# Patient Record
Sex: FEMALE | Race: WHITE | NOT HISPANIC OR LATINO
[De-identification: names, ages, dates, MRNs, and addresses within clinical notes are randomized per-mention and may not be internally consistent; named-entity substitution may affect disease eponyms.]

---

## 2019-05-13 DIAGNOSIS — Z00.00 ENCOUNTER FOR GENERAL ADULT MEDICAL EXAMINATION W/OUT ABNORMAL FINDINGS: ICD-10-CM

## 2019-05-20 ENCOUNTER — APPOINTMENT (OUTPATIENT)
Dept: INTERNAL MEDICINE | Facility: CLINIC | Age: 44
End: 2019-05-20

## 2019-05-23 ENCOUNTER — APPOINTMENT (OUTPATIENT)
Dept: INTERNAL MEDICINE | Facility: CLINIC | Age: 44
End: 2019-05-23

## 2020-06-13 ENCOUNTER — EMERGENCY (EMERGENCY)
Facility: HOSPITAL | Age: 45
LOS: 1 days | Discharge: HOME | End: 2020-06-13
Attending: EMERGENCY MEDICINE
Payer: COMMERCIAL

## 2020-06-13 VITALS
RESPIRATION RATE: 18 BRPM | HEART RATE: 93 BPM | DIASTOLIC BLOOD PRESSURE: 84 MMHG | SYSTOLIC BLOOD PRESSURE: 140 MMHG | TEMPERATURE: 98 F | OXYGEN SATURATION: 99 %

## 2020-06-13 VITALS
RESPIRATION RATE: 20 BRPM | OXYGEN SATURATION: 100 % | DIASTOLIC BLOOD PRESSURE: 96 MMHG | HEART RATE: 96 BPM | SYSTOLIC BLOOD PRESSURE: 138 MMHG | HEIGHT: 62 IN | TEMPERATURE: 98 F | WEIGHT: 179.9 LBS

## 2020-06-13 LAB
ALBUMIN SERPL ELPH-MCNC: 4.6 G/DL — SIGNIFICANT CHANGE UP (ref 3.3–5)
ALP SERPL-CCNC: 141 U/L — HIGH (ref 40–120)
ALT FLD-CCNC: 17 U/L — SIGNIFICANT CHANGE UP (ref 10–45)
ANION GAP SERPL CALC-SCNC: 15 MMOL/L — SIGNIFICANT CHANGE UP (ref 5–17)
APPEARANCE UR: ABNORMAL
AST SERPL-CCNC: 15 U/L — SIGNIFICANT CHANGE UP (ref 10–40)
BACTERIA # UR AUTO: ABNORMAL
BASOPHILS # BLD AUTO: 0.03 K/UL — SIGNIFICANT CHANGE UP (ref 0–0.2)
BASOPHILS NFR BLD AUTO: 0.2 % — SIGNIFICANT CHANGE UP (ref 0–2)
BILIRUB SERPL-MCNC: 0.6 MG/DL — SIGNIFICANT CHANGE UP (ref 0.2–1.2)
BILIRUB UR-MCNC: NEGATIVE — SIGNIFICANT CHANGE UP
BUN SERPL-MCNC: 12 MG/DL — SIGNIFICANT CHANGE UP (ref 7–23)
CALCIUM SERPL-MCNC: 9.6 MG/DL — SIGNIFICANT CHANGE UP (ref 8.4–10.5)
CHLORIDE SERPL-SCNC: 100 MMOL/L — SIGNIFICANT CHANGE UP (ref 96–108)
CO2 SERPL-SCNC: 22 MMOL/L — SIGNIFICANT CHANGE UP (ref 22–31)
COLOR SPEC: YELLOW — SIGNIFICANT CHANGE UP
CREAT SERPL-MCNC: 0.64 MG/DL — SIGNIFICANT CHANGE UP (ref 0.5–1.3)
DIFF PNL FLD: NEGATIVE — SIGNIFICANT CHANGE UP
EOSINOPHIL # BLD AUTO: 0.05 K/UL — SIGNIFICANT CHANGE UP (ref 0–0.5)
EOSINOPHIL NFR BLD AUTO: 0.4 % — SIGNIFICANT CHANGE UP (ref 0–6)
EPI CELLS # UR: 16 /HPF — HIGH
GAS PNL BLDV: SIGNIFICANT CHANGE UP
GLUCOSE SERPL-MCNC: 119 MG/DL — HIGH (ref 70–99)
GLUCOSE UR QL: NEGATIVE — SIGNIFICANT CHANGE UP
HCT VFR BLD CALC: 41 % — SIGNIFICANT CHANGE UP (ref 34.5–45)
HGB BLD-MCNC: 13.4 G/DL — SIGNIFICANT CHANGE UP (ref 11.5–15.5)
HYALINE CASTS # UR AUTO: 8 /LPF — HIGH (ref 0–2)
IMM GRANULOCYTES NFR BLD AUTO: 0.4 % — SIGNIFICANT CHANGE UP (ref 0–1.5)
KETONES UR-MCNC: ABNORMAL
LEUKOCYTE ESTERASE UR-ACNC: ABNORMAL
LIDOCAIN IGE QN: 26 U/L — SIGNIFICANT CHANGE UP (ref 7–60)
LYMPHOCYTES # BLD AUTO: 1.79 K/UL — SIGNIFICANT CHANGE UP (ref 1–3.3)
LYMPHOCYTES # BLD AUTO: 14.8 % — SIGNIFICANT CHANGE UP (ref 13–44)
MCHC RBC-ENTMCNC: 27 PG — SIGNIFICANT CHANGE UP (ref 27–34)
MCHC RBC-ENTMCNC: 32.7 GM/DL — SIGNIFICANT CHANGE UP (ref 32–36)
MCV RBC AUTO: 82.5 FL — SIGNIFICANT CHANGE UP (ref 80–100)
MONOCYTES # BLD AUTO: 0.43 K/UL — SIGNIFICANT CHANGE UP (ref 0–0.9)
MONOCYTES NFR BLD AUTO: 3.6 % — SIGNIFICANT CHANGE UP (ref 2–14)
NEUTROPHILS # BLD AUTO: 9.72 K/UL — HIGH (ref 1.8–7.4)
NEUTROPHILS NFR BLD AUTO: 80.6 % — HIGH (ref 43–77)
NITRITE UR-MCNC: NEGATIVE — SIGNIFICANT CHANGE UP
NRBC # BLD: 0 /100 WBCS — SIGNIFICANT CHANGE UP (ref 0–0)
PH UR: 8.5 — HIGH (ref 5–8)
PLATELET # BLD AUTO: 472 K/UL — HIGH (ref 150–400)
POTASSIUM SERPL-MCNC: 3.7 MMOL/L — SIGNIFICANT CHANGE UP (ref 3.5–5.3)
POTASSIUM SERPL-SCNC: 3.7 MMOL/L — SIGNIFICANT CHANGE UP (ref 3.5–5.3)
PROT SERPL-MCNC: 8.4 G/DL — HIGH (ref 6–8.3)
PROT UR-MCNC: ABNORMAL
RBC # BLD: 4.97 M/UL — SIGNIFICANT CHANGE UP (ref 3.8–5.2)
RBC # FLD: 14 % — SIGNIFICANT CHANGE UP (ref 10.3–14.5)
RBC CASTS # UR COMP ASSIST: 4 /HPF — SIGNIFICANT CHANGE UP (ref 0–4)
SODIUM SERPL-SCNC: 137 MMOL/L — SIGNIFICANT CHANGE UP (ref 135–145)
SP GR SPEC: 1.02 — SIGNIFICANT CHANGE UP (ref 1.01–1.02)
UROBILINOGEN FLD QL: NEGATIVE — SIGNIFICANT CHANGE UP
WBC # BLD: 12.07 K/UL — HIGH (ref 3.8–10.5)
WBC # FLD AUTO: 12.07 K/UL — HIGH (ref 3.8–10.5)
WBC UR QL: 41 /HPF — HIGH (ref 0–5)

## 2020-06-13 PROCEDURE — 82947 ASSAY GLUCOSE BLOOD QUANT: CPT

## 2020-06-13 PROCEDURE — 83605 ASSAY OF LACTIC ACID: CPT

## 2020-06-13 PROCEDURE — 99284 EMERGENCY DEPT VISIT MOD MDM: CPT | Mod: 25

## 2020-06-13 PROCEDURE — 83690 ASSAY OF LIPASE: CPT

## 2020-06-13 PROCEDURE — 84132 ASSAY OF SERUM POTASSIUM: CPT

## 2020-06-13 PROCEDURE — 93010 ELECTROCARDIOGRAM REPORT: CPT

## 2020-06-13 PROCEDURE — 81001 URINALYSIS AUTO W/SCOPE: CPT

## 2020-06-13 PROCEDURE — 84295 ASSAY OF SERUM SODIUM: CPT

## 2020-06-13 PROCEDURE — 82962 GLUCOSE BLOOD TEST: CPT

## 2020-06-13 PROCEDURE — 80053 COMPREHEN METABOLIC PANEL: CPT

## 2020-06-13 PROCEDURE — 85027 COMPLETE CBC AUTOMATED: CPT

## 2020-06-13 PROCEDURE — 99285 EMERGENCY DEPT VISIT HI MDM: CPT

## 2020-06-13 PROCEDURE — 82803 BLOOD GASES ANY COMBINATION: CPT

## 2020-06-13 PROCEDURE — 82330 ASSAY OF CALCIUM: CPT

## 2020-06-13 PROCEDURE — 82435 ASSAY OF BLOOD CHLORIDE: CPT

## 2020-06-13 PROCEDURE — 84702 CHORIONIC GONADOTROPIN TEST: CPT

## 2020-06-13 PROCEDURE — 85014 HEMATOCRIT: CPT

## 2020-06-13 PROCEDURE — 87086 URINE CULTURE/COLONY COUNT: CPT

## 2020-06-13 PROCEDURE — 96374 THER/PROPH/DIAG INJ IV PUSH: CPT

## 2020-06-13 PROCEDURE — 93005 ELECTROCARDIOGRAM TRACING: CPT

## 2020-06-13 RX ORDER — SODIUM CHLORIDE 9 MG/ML
1000 INJECTION INTRAMUSCULAR; INTRAVENOUS; SUBCUTANEOUS ONCE
Refills: 0 | Status: COMPLETED | OUTPATIENT
Start: 2020-06-13 | End: 2020-06-13

## 2020-06-13 RX ORDER — METOCLOPRAMIDE HCL 10 MG
10 TABLET ORAL ONCE
Refills: 0 | Status: COMPLETED | OUTPATIENT
Start: 2020-06-13 | End: 2020-06-13

## 2020-06-13 RX ADMIN — Medication 10 MILLIGRAM(S): at 19:37

## 2020-06-13 RX ADMIN — SODIUM CHLORIDE 1000 MILLILITER(S): 9 INJECTION INTRAMUSCULAR; INTRAVENOUS; SUBCUTANEOUS at 19:01

## 2020-06-13 NOTE — ED ADULT NURSE REASSESSMENT NOTE - NS ED NURSE REASSESS COMMENT FT1
Report received from Tawnya Lion. AOx3, speaking in complete sentences. Unlabored, spontaneous respirations, NAD. CM in place NSR HR 90s. Pt reports nausea, MD De La Rosa aware. Awaiting blood results and urine sample at this time.

## 2020-06-13 NOTE — ED PROVIDER NOTE - NSFOLLOWUPINSTRUCTIONS_ED_ALL_ED_FT
1) Please follow-up with your primary care doctor about your diabetes medication regimen. We recommend that for the time being you discontinue use of the Ozempic as its use appears to correlate very closely with your symptoms.  If you cannot follow-up with your doctor(s), please return to the ED for any urgent issues.  2) If you have any worsening of symptoms or any other concerns please return to the ED immediately.  3) Please continue taking your home medications as directed.  4) You may have been given a copy of your labs and/or imaging.  Please go over these with your primary care doctor.    Nausea / Vomiting    Nausea is the feeling that you have to vomit. As nausea gets worse, it can lead to vomiting. Vomiting puts you at an increased risk for dehydration. Older adults and people with other diseases or a weak immune system are at higher risk for dehydration. Drink clear fluids in small but frequent amounts as tolerated. Eat bland, easy-to-digest foods in small amounts as tolerated.    SEEK IMMEDIATE MEDICAL CARE IF YOU HAVE ANY OF THE FOLLOWING SYMPTOMS: fever, inability to keep sufficient fluids down, black or bloody vomitus, black or bloody stools, lightheadedness/dizziness, chest pain, severe headache, rash, shortness of breath, cold or clammy skin, confusion, pain with urination, or any signs of dehydration.

## 2020-06-13 NOTE — ED PROVIDER NOTE - ATTENDING CONTRIBUTION TO CARE
I saw and evaluated patient with resident. I discussed H+P and MDM with resident. I agree with the statements made by the resident unless otherwise noted.    The care of this patient was in support of the [facility] countermeasures to Covid-19.

## 2020-06-13 NOTE — ED PROVIDER NOTE - PATIENT PORTAL LINK FT
You can access the FollowMyHealth Patient Portal offered by [facility] by registering at the following website: [email or web address]. By joining [employer]’s FollowMyHealth portal, you will also be able to view your health information using other applications (apps) compatible with our system.

## 2020-06-13 NOTE — ED PROVIDER NOTE - CLINICAL SUMMARY MEDICAL DECISION MAKING FREE TEXT BOX
44F p/w vomiting in setting of recent dm medication switch. Mildly dehydrated appearing, abdomen soft. Will rehydrate, check electrolyes, ekg, r/o DKA. 44F p/w vomiting in setting of recent dm medication switch. Mildly dehydrated appearing, abdomen soft. Will rehydrate, check electrolyes, ekg, r/o DKA.    MUSA De La Rosa MD: Pt is a 45 y/o female with PMH dm, hld, who presents from urgent care for nausea and vomiting. States that she recently started a new DM medication (once a week injection of semaglutide). Pt took 2nd dose today which caused onset of n/v. States she had similar rxn 1 week ago in PCPs office when she self-administered the medication at that time.  Has not been able to tolerate PO today. Had mild AP, now resolved. Rec'd 8mg zofran PTA. Pt denies: chest pain, SOB, cough, fevers, chills, pleuritic chest pain, diarrhea, back pain, neck pain, HA, neck stiffness, focal numbness or weakness, visual changes, dizziness, lightheadedness, leg pain/swelling, recent travel, recent trauma, recent immobilization, dysuria, hematuria, rash. Ddx includes, however, is not limited to: adverse medication rxn, pancreatitis, DKA, dehydration, electrolyte abnormality, other. Plan: basic labs, IVF, antiemetics, reassess

## 2020-06-13 NOTE — ED ADULT NURSE NOTE - OBJECTIVE STATEMENT
44 year old female presents to the ED with nausea and vomiting since yesterday.  She said nausea has been constant and has had decreased po intake over the past day as well.  She has been constipated for one week.  She is on Ozempic for about one week and said she yesterday she took 2mg yesterday.  She went to urgent care today and was given 8mg of Zofran with some relief of nausea.  She denies: chest pain, shortness of breath, fevers, sick contacts, diarrhea, urinary frequency or burning.

## 2020-06-13 NOTE — ED PROVIDER NOTE - OBJECTIVE STATEMENT
44F hx dm, hld, p/f nausea and vomiting in setting of recently starting a new DM medication (once a week injection of semaglutide). Pt says her primary (Collette) recently switched her from metformin for reasons pt is unsure of. Pt said she has vomited more this morning than she ever has. Says the vomit was nonbloody. Says vomiting has been assoc w/ mild epigastric abdominal pain. Denies diarrhea, surgical hx, sob/cp, visual changes.

## 2020-06-14 LAB
CULTURE RESULTS: SIGNIFICANT CHANGE UP
SPECIMEN SOURCE: SIGNIFICANT CHANGE UP

## 2020-06-15 NOTE — ED POST DISCHARGE NOTE - DETAILS
6/15/2020: left VM for patient to call back. -Apurva Clemons PA-C spoke with patient and she states that she is not having any urinary symptoms at all and was not diagnosed with a UTI. advised her that she does not need antibiotics at this time, but to have urine culture repeated if she develops symptoms. patient verbalized understanding and agreement with plan. -Apurva Clemons PA-C

## 2020-06-15 NOTE — ED POST DISCHARGE NOTE - RESULT SUMMARY
urine culture contaminated though urinalysis +. patient needs repeat culture if still symptomatic, no antibiotics sent.

## 2020-07-01 PROBLEM — G47.00 INSOMNIA, UNSPECIFIED: Chronic | Status: ACTIVE | Noted: 2020-06-13

## 2020-07-01 PROBLEM — E78.5 HYPERLIPIDEMIA, UNSPECIFIED: Chronic | Status: ACTIVE | Noted: 2020-06-13

## 2020-07-01 PROBLEM — F32.9 MAJOR DEPRESSIVE DISORDER, SINGLE EPISODE, UNSPECIFIED: Chronic | Status: ACTIVE | Noted: 2020-06-13

## 2020-07-01 PROBLEM — E11.9 TYPE 2 DIABETES MELLITUS WITHOUT COMPLICATIONS: Chronic | Status: ACTIVE | Noted: 2020-06-13

## 2020-07-02 ENCOUNTER — APPOINTMENT (OUTPATIENT)
Dept: GASTROENTEROLOGY | Facility: CLINIC | Age: 45
End: 2020-07-02
Payer: COMMERCIAL

## 2020-07-02 VITALS
HEIGHT: 62 IN | OXYGEN SATURATION: 98 % | SYSTOLIC BLOOD PRESSURE: 130 MMHG | TEMPERATURE: 98.7 F | BODY MASS INDEX: 32.02 KG/M2 | DIASTOLIC BLOOD PRESSURE: 80 MMHG | WEIGHT: 174 LBS | HEART RATE: 99 BPM

## 2020-07-02 DIAGNOSIS — K52.9 NONINFECTIVE GASTROENTERITIS AND COLITIS, UNSPECIFIED: ICD-10-CM

## 2020-07-02 DIAGNOSIS — Z83.79 FAMILY HISTORY OF OTHER DISEASES OF THE DIGESTIVE SYSTEM: ICD-10-CM

## 2020-07-02 PROCEDURE — 99203 OFFICE O/P NEW LOW 30 MIN: CPT

## 2020-07-02 RX ORDER — BUPROPION HCL 150 MG
150 TABLET,SUSTAINED-RELEASE 12 HR ORAL
Refills: 0 | Status: ACTIVE | COMMUNITY

## 2020-07-02 RX ORDER — LISDEXAMFETAMINE DIMESYLATE 50 MG/1
50 CAPSULE ORAL
Refills: 0 | Status: ACTIVE | COMMUNITY

## 2020-07-02 RX ORDER — METFORMIN HYDROCHLORIDE 1000 MG/1
1000 TABLET, COATED ORAL
Refills: 0 | Status: ACTIVE | COMMUNITY

## 2020-07-02 RX ORDER — LINAGLIPTIN 5 MG/1
5 TABLET, FILM COATED ORAL
Refills: 0 | Status: ACTIVE | COMMUNITY

## 2020-07-02 NOTE — HISTORY OF PRESENT ILLNESS
[FreeTextEntry1] : \par 2 nights ago awoke at 2am; had bitter taste in mouth; no fever or chills\par then awoke vomiting at 4am - vomited for 24 hours; retched 7-8 x with each episode\par did have diarrhea 1pm yesterday afternoon\par felt fine by yesterday morning\par Had eaten same dinner as parents - they did not get sick; \par \par 3 weeks ago, in the city, had same thiing - after a new medication with a high does - Ozempic - went to ER in [facility], and stayed with parents for the week\par stopped the Ozempic\par \par Has had 4 bouts of 24 hour GI bugs\par \par Has GERD almost daily - better with omeprazole, taking daily since March; can taste it in back of throat\par \par Had last EGD 5 years ago - \par \par Recently diagnosed with DM, 3 months ago, with A1C 7.7\par had been placed on Tradjenta and metformin\par Ozempic was added in May; at second dose had vomiting reaction\par  last A1C = 7.1\par Saw enodcirne today, told to only take metformin\par

## 2020-07-02 NOTE — ASSESSMENT
[FreeTextEntry1] : \par 24 hour GI illness - better now; fluids; reassurance; no clear relation to event 3 weeks ago related to medication\par \par GERD - reviewed lifestyle modification\par can perhaps perform EGD at screening colonoscopy at age 47

## 2020-07-02 NOTE — PHYSICAL EXAM
[General Appearance - Alert] : alert [General Appearance - In No Acute Distress] : in no acute distress [Auscultation Breath Sounds / Voice Sounds] : lungs were clear to auscultation bilaterally [Heart Rate And Rhythm] : heart rate was normal and rhythm regular [Heart Sounds Gallop] : no gallops [Murmurs] : no murmurs [Heart Sounds] : normal S1 and S2 [Abdomen Soft] : soft [Bowel Sounds] : normal bowel sounds [Heart Sounds Pericardial Friction Rub] : no pericardial rub [Abdomen Mass (___ Cm)] : no abdominal mass palpated [] : no hepato-splenomegaly [Abdomen Tenderness] : non-tender

## 2023-11-20 NOTE — ED ADULT TRIAGE NOTE - AS HEIGHT TYPE
Called patient, writer reported patient will be due for a breast MRI in March of next year, order has been placed, to please call central scheduling to schedule, office will call with results and further recommendations.   
Please order MRI for 6 months from mammo and let pt know.     
stated